# Patient Record
Sex: FEMALE
[De-identification: names, ages, dates, MRNs, and addresses within clinical notes are randomized per-mention and may not be internally consistent; named-entity substitution may affect disease eponyms.]

---

## 2018-04-03 ENCOUNTER — HOSPITAL ENCOUNTER (EMERGENCY)
Dept: HOSPITAL 92 - ERS | Age: 48
Discharge: LEFT BEFORE DISCHARGE | End: 2018-04-03
Payer: SELF-PAY

## 2018-04-03 DIAGNOSIS — E11.65: Primary | ICD-10-CM

## 2018-04-03 DIAGNOSIS — F32.9: ICD-10-CM

## 2018-04-03 DIAGNOSIS — R07.9: ICD-10-CM

## 2018-04-03 DIAGNOSIS — Z79.84: ICD-10-CM

## 2018-04-03 DIAGNOSIS — Z86.718: ICD-10-CM

## 2018-04-03 LAB
ALBUMIN SERPL BCG-MCNC: 4.5 G/DL (ref 3.5–5)
ALP SERPL-CCNC: 96 U/L (ref 40–150)
ALT SERPL W P-5'-P-CCNC: 30 U/L (ref 8–55)
ANION GAP SERPL CALC-SCNC: 14 MMOL/L (ref 10–20)
AST SERPL-CCNC: 21 U/L (ref 5–34)
BASOPHILS # BLD AUTO: 0.1 THOU/UL (ref 0–0.2)
BASOPHILS NFR BLD AUTO: 0.9 % (ref 0–1)
BILIRUB SERPL-MCNC: 1.5 MG/DL (ref 0.2–1.2)
BUN SERPL-MCNC: 8 MG/DL (ref 7–18.7)
CALCIUM SERPL-MCNC: 10.3 MG/DL (ref 7.8–10.44)
CHLORIDE SERPL-SCNC: 97 MMOL/L (ref 98–107)
CK MB SERPL-MCNC: 0.8 NG/ML (ref 0–6.6)
CO2 SERPL-SCNC: 26 MMOL/L (ref 22–29)
CREAT CL PREDICTED SERPL C-G-VRATE: 0 ML/MIN (ref 70–130)
EOSINOPHIL # BLD AUTO: 0.1 THOU/UL (ref 0–0.7)
EOSINOPHIL NFR BLD AUTO: 0.9 % (ref 0–10)
GLOBULIN SER CALC-MCNC: 4 G/DL (ref 2.4–3.5)
GLUCOSE SERPL-MCNC: 426 MG/DL (ref 70–105)
HGB BLD-MCNC: 14.9 G/DL (ref 12–16)
LYMPHOCYTES # BLD: 3.9 THOU/UL (ref 1.2–3.4)
LYMPHOCYTES NFR BLD AUTO: 41.9 % (ref 21–51)
MACROCYTES BLD QL SMEAR: (no result) (100X)
MCH RBC QN AUTO: 40.7 PG (ref 27–31)
MCV RBC AUTO: 110 FL (ref 81–99)
MDIFF COMPLETE?: YES
MONOCYTES # BLD AUTO: 0.5 THOU/UL (ref 0.11–0.59)
MONOCYTES NFR BLD AUTO: 5.4 % (ref 0–10)
NEUTROPHILS # BLD AUTO: 4.8 THOU/UL (ref 1.4–6.5)
NEUTROPHILS NFR BLD AUTO: 50.9 % (ref 42–75)
PLATELET # BLD AUTO: 227 THOU/UL (ref 130–400)
POTASSIUM SERPL-SCNC: 4.2 MMOL/L (ref 3.5–5.1)
RBC # BLD AUTO: 3.65 MILL/UL (ref 4.2–5.4)
SODIUM SERPL-SCNC: 133 MMOL/L (ref 136–145)
TROPONIN I SERPL DL<=0.01 NG/ML-MCNC: (no result) NG/ML (ref ?–0.03)
WBC # BLD AUTO: 9.4 THOU/UL (ref 4.8–10.8)

## 2018-04-03 PROCEDURE — 82010 KETONE BODYS QUAN: CPT

## 2018-04-03 PROCEDURE — 84484 ASSAY OF TROPONIN QUANT: CPT

## 2018-04-03 PROCEDURE — 80053 COMPREHEN METABOLIC PANEL: CPT

## 2018-04-03 PROCEDURE — 93005 ELECTROCARDIOGRAM TRACING: CPT

## 2018-04-03 PROCEDURE — 82553 CREATINE MB FRACTION: CPT

## 2018-04-03 PROCEDURE — 71045 X-RAY EXAM CHEST 1 VIEW: CPT

## 2018-04-03 PROCEDURE — 85025 COMPLETE CBC W/AUTO DIFF WBC: CPT

## 2018-04-03 NOTE — RAD
PORTABLE CHEST:

4/3/18

 

HISTORY: 

Chest pain.

 

The lung fields are clear. Heart and mediastinum appear normal. 

 

IMPRESSION:  

Negative portable chest. 

 

POS: AGW

## 2018-04-26 NOTE — EKG
Test Reason : 

Blood Pressure : ***/*** mmHG

Vent. Rate : 087 BPM     Atrial Rate : 087 BPM

   P-R Int : 150 ms          QRS Dur : 078 ms

    QT Int : 378 ms       P-R-T Axes : [phone] degrees

   QTc Int : 454 ms

 

Normal sinus rhythm

Normal ECG

 

Confirmed by SUSSY JONES ([id number]),  ERON BARLOW ([id number]) on 4/26/2018 3:04:35 PM

 

Referred By:             Confirmed By:SUSSY JONES